# Patient Record
Sex: MALE | Race: WHITE | Employment: OTHER | URBAN - METROPOLITAN AREA
[De-identification: names, ages, dates, MRNs, and addresses within clinical notes are randomized per-mention and may not be internally consistent; named-entity substitution may affect disease eponyms.]

---

## 2018-05-30 ENCOUNTER — HOSPITAL ENCOUNTER (OUTPATIENT)
Dept: LAB | Age: 78
Discharge: HOME | End: 2018-05-30
Payer: MEDICARE

## 2018-05-30 ENCOUNTER — OFFICE VISIT (OUTPATIENT)
Dept: UROLOGY | Age: 78
End: 2018-05-30

## 2018-05-30 VITALS
DIASTOLIC BLOOD PRESSURE: 77 MMHG | BODY MASS INDEX: 17.04 KG/M2 | HEIGHT: 66 IN | HEART RATE: 92 BPM | OXYGEN SATURATION: 96 % | WEIGHT: 106 LBS | SYSTOLIC BLOOD PRESSURE: 129 MMHG

## 2018-05-30 DIAGNOSIS — C67.9 MALIGNANT NEOPLASM OF URINARY BLADDER, UNSPECIFIED SITE (HCC): ICD-10-CM

## 2018-05-30 DIAGNOSIS — N40.0 BENIGN PROSTATIC HYPERPLASIA, UNSPECIFIED WHETHER LOWER URINARY TRACT SYMPTOMS PRESENT: ICD-10-CM

## 2018-05-30 DIAGNOSIS — C67.9 MALIGNANT NEOPLASM OF URINARY BLADDER, UNSPECIFIED SITE (HCC): Primary | ICD-10-CM

## 2018-05-30 LAB
BILIRUB UR QL STRIP: NEGATIVE
CREAT SERPL-MCNC: 1.23 MG/DL (ref 0.6–1.3)
GLUCOSE UR-MCNC: NEGATIVE MG/DL
KETONES P FAST UR STRIP-MCNC: NEGATIVE MG/DL
PH UR STRIP: 6 [PH] (ref 4.6–8)
PROT UR QL STRIP: NEGATIVE
PSA SERPL-MCNC: 1.3 NG/ML (ref 0–4)
SP GR UR STRIP: 1.02 (ref 1–1.03)
UA UROBILINOGEN AMB POC: NORMAL (ref 0.2–1)
URINALYSIS CLARITY POC: CLEAR
URINALYSIS COLOR POC: YELLOW
URINE BLOOD POC: NEGATIVE
URINE LEUKOCYTES POC: NEGATIVE
URINE NITRITES POC: NEGATIVE

## 2018-05-30 PROCEDURE — 84153 ASSAY OF PSA TOTAL: CPT | Performed by: UROLOGY

## 2018-05-30 PROCEDURE — 82565 ASSAY OF CREATININE: CPT | Performed by: UROLOGY

## 2018-05-30 NOTE — PROGRESS NOTES
Mr. Kiya Stuart has a reminder for a \"due or due soon\" health maintenance. I have asked that he contact his primary care provider for follow-up on this health maintenance.

## 2018-05-30 NOTE — PROGRESS NOTES
Gene KRISTIE Sierra 76 y.o. male     Mr. Chema William seen today for bladder tumor follow-up-patient is a 20 year history of recurrent low-grade bladder tumors previously managed by [facility] most recent cystoscopic fulguration in 2016-also has history of symptomatic BPH undergoing laser TURP several years ago-patient tells me he has undergone TURBT 5 or 6 times with pathology showing low grade T-cell carcinoma    Patient is currently voiding with a forceful stream good control dysuria once or twice per night    Negative urine cytology in September 2017    Small bladder tumor fulguration in 2016    PSA 1.1 in 2014    CT scan imaging with IV contrast in 2014 shows normal kidneys ureters and bladder-report only available for review-no images available on PACS    Review of Systems:   CNS: Blurred vision-frequent headaches- no dizziness seizure or syncope/chronic stress and anxiety  Respiratory: COPD-no wheezing or  shortness of breath or cough  Cardiovascular: Hypertension-no angina no palpitations  Intestinal: No dyspepsia diarrhea or constipation  Urinary: Symptomatic BPH with recurrent low-grade bladder tumors  Skeletal: Low back pain/large joint arthritis-diffuse muscle aches and pains  Endocrine: No diabetes or thyroid disease  Other:    Allergies: Allergies   Allergen Reactions    Bupropion Unknown (comments)    Influenza Virus Vaccines Other (comments)     Intolerance       Medications:    Current Outpatient Prescriptions   Medication Sig Dispense Refill    calcium-cholecalciferol, D3, (CALTRATE 600+D) tablet       ibandronate (BONIVA) 150 mg tablet       venlafaxine-SR (EFFEXOR XR) 150 mg capsule Take  by mouth daily.  losartan-hydrochlorothiazide (HYZAAR) 100-25 mg per tablet Take 1 Tab by mouth daily.  simvastatin (ZOCOR) 10 mg tablet Take  by mouth nightly.  ADULT ASPIRIN EC LOW STRENGTH PO Take  by mouth.         ADVAIR -41 mcg/actuation inhaler       cyanocobalamin (VITAMIN B12) 2,500 mcg sublingual tablet Take 2,500 mcg by mouth daily.  MULTIVITAMINS,THERAPEUTIC (THERAGRAN PO) Take  by mouth. Past Medical History:   Diagnosis Date    Anxiety     Atherosclerosis of native arteries of the extremities, unspecified     BPH (benign prostatic hypertrophy) with urinary obstruction     BPH with obstruction/lower urinary tract symptoms     Chronic airway obstruction, not elsewhere classified     Claudication (HCC)     Colon polyp     COPD (chronic obstructive pulmonary disease) (HCC)     Depression     ED (erectile dysfunction) of organic origin     Heart disease, unspecified     Hematuria     Hemorrhoid     HTN (hypertension)     Hypercholesteremia     Hyperlipidemia     Malignant neoplasm of lateral wall of urinary bladder ([address] 10/16/2014    TaG1 TCC, s/p TURBT 10/16/14    Nocturia     Nonspecific abnormal electrocardiogram (ECG) (EKG)     Occult GI bleeding     Osteoporosis     [facility]lobular emphysema ([facility]     Tobacco use disorder     Unspecified hearing loss       Past Surgical History:   Procedure Laterality Date    HX CATARACT REMOVAL      bilateral    HX HERNIA REPAIR      HX MOHS PROCEDURES      HX OTHER SURGICAL      PVP (10 years ago)    HX OTHER SURGICAL  2014    stigmatism fixed    HX TONSILLECTOMY      HX UROLOGICAL  10/16/2014    [address] [address], Cystoscopy, TURBT (medium), Instillation of intravesical chemotherapy (mitomycin C). Dr. Romero  HX UROLOGICAL  2016    [address]. TURBT: Low grade papillary urothelial carcinoma, non-invasive (pTa). Dr. Karen Price.      Family History   Problem Relation Age of Onset    Heart Disease Father     Other Father     Dementia Mother     Other Paternal Grandmother     Other Brother     Cancer Maternal Aunt         Physical Examination: Nourished mature male thin and trim appearing    Abdomen is nontender no palpable masses no organomegaly  Back-no percussion CVA tenderness on either side  No inguinal hernia or adenopathy  Penis is normal  Testes are normal in size shape and consistency bilaterally-no epididymal induration or tenderness on either side  Spermatic cords are palpably normal bilaterally  Scrotum is normal  Prostate by JENNIFER is rounded, smooth, benign in consistency and nontender-no induration no nodularity  No rectal masses tenderness or induration      Urinalysis: Negative dipstick/nitrite negative/heme-negative        Impression: Symptomatic BPH-stable status post laser TURP                        Recurrent low-grade T-cell carcinoma bladder        Plan: Cystoscopy next visit    RTC 1 month cystoscopy        Alena Bolanos MD  -electronically signed-    PLEASE NOTE:  This document has been produced using voice recognition software. Unrecognized errors in transcription may be present.

## 2018-05-30 NOTE — MR AVS SNAPSHOT
[address] [address] 
[phone] Patient: Selin Conti MRN: [id number] :1940 Visit Information Date & Time Provider Department Dept. Phone Encounter #  
 2018 10:00 AM Raquel Mendez, [facility] [id number] Upcoming Health Maintenance Date Due DTaP/Tdap/Td series (1 - Tdap) 10/29/1959 ZOSTER VACCINE AGE 60> 1998 GLAUCOMA SCREENING Q2Y 10/29/2003 Pneumococcal 65+ High/Highest Risk (1 of 2 - PCV13) 10/29/2003 MEDICARE YEARLY EXAM 3/14/2018 Influenza Age 5 to Adult 2018 Allergies as of 2018  Review Complete On: 2018 By: Jose Thibodeaux LPN Severity Noted Reaction Type Reactions Bupropion  2012    Unknown (comments) Influenza Virus Vaccines  2012    Other (comments) Intolerance Current Immunizations  Never Reviewed No immunizations on file. Not reviewed this visit You Were Diagnosed With   
  
 Codes Comments Malignant neoplasm of urinary bladder, unspecified site [facility])    -  Primary ICD-10-CM: C67.9 ICD-9-CM: 188. 9 Benign prostatic hyperplasia, unspecified whether lower urinary tract symptoms present     ICD-10-CM: N40.0 ICD-9-CM: 600.00 Vitals BP Pulse Height(growth percentile) Weight(growth percentile) SpO2 BMI  
 129/77 (BP 1 Location: Right arm, BP Patient Position: Sitting) 92 5' 6\" (1.676 m) 106 lb (48.1 kg) 96% 17.11 kg/m2 Smoking Status Current Every Day Smoker Vitals History BMI and BSA Data Body Mass Index Body Surface Area  
 17.11 kg/m 2 1.5 m 2 Preferred Pharmacy Pharmacy Name Phone [phone] [place] [address],[address] [phone] Your Updated Medication List  
  
   
This list is accurate as of 18 10:28 AM.  Always use your most recent med list.  
  
  
  
  
 ADULT ASPIRIN EC LOW STRENGTH PO Take  by mouth. ADVAIR -21 mcg/actuation inhaler Generic drug:  fluticasone-salmeterol  
  
 calcium-cholecalciferol (D3) tablet Commonly known as:  CALTRATE 600+D  
  
 cyanocobalamin 2,500 mcg sublingual tablet Commonly known as:  VITAMIN B12 Take 2,500 mcg by mouth daily. EFFEXOR  mg capsule Generic drug:  venlafaxine-SR Take  by mouth daily. HYZAAR 100-25 mg per tablet Generic drug:  losartan-hydroCHLOROthiazide Take 1 Tab by mouth daily. ibandronate 150 mg tablet Commonly known as:  Earleen List THERAGRAN PO Take  by mouth. ZOCOR 10 mg tablet Generic drug:  simvastatin Take  by mouth nightly. We Performed the Following AMB POC URINALYSIS DIP STICK AUTO W/O MICRO [93258 CPT(R)] COLLECTION VENOUS BLOOD,VENIPUNCTURE [id number] CPT(R)] Patient Instructions Bladder Cancer: Care Instructions Your Care Instructions Bladder cancer occurs when abnormal cells grow out of control in the bladder. It usually can be cured when it is found early. It is more common in older people. Treatment may include surgery to remove part of the bladder. If the tumor is large, the entire bladder may be removed. You may also have radiation or chemotherapy to kill the cancer cells. Sometimes people get treatment with medicines that help the body's natural defenses, or immune system, fight the cancer. Finding out that you have cancer is scary. You may feel many emotions and may need some help coping. Seek out family, friends, and counselors for support. You also can do things at home to make yourself feel better while you go through treatment. Call the [employer] (2-685.765.1186) or visit its website at [employer] [employer]. org for more information. Follow-up care is a key part of your treatment and safety.  Be sure to make and go to all appointments, and call your doctor if you are having problems. It's also a good idea to know your test results and keep a list of the medicines you take. How can you care for yourself at home? · Take your medicines exactly as prescribed. Call your doctor if you think you are having a problem with your medicine. You may get medicine for nausea and vomiting if you have these side effects. · Eat healthy food. If you are not hungry, try to eat food that has protein and extra calories to keep up your strength and prevent weight loss. Drink liquid meal replacements for extra calories and protein. Try to eat your main meal early. · Get some physical activity every day, but do not get too tired. Keep doing the hobbies you enjoy as your energy allows. · Take steps to control your stress and workload. Learn relaxation techniques. ¨ Share your feelings. Stress and tension affect our emotions. By expressing your feelings to others, you may be able to understand and cope with them. ¨ Consider joining a support group. Talking about a problem with your spouse, a good friend, or other people with similar problems is a good way to reduce tension and stress. ¨ Express yourself through art. Try writing, dance, art, or crafts to relieve tension. Some dance, writing, or art groups may be available just for people who have cancer. ¨ Be kind to your body and mind. Getting enough sleep, eating a healthy diet, and taking time to do things you enjoy can contribute to an overall feeling of balance in your life and help reduce stress. ¨ Get help if you need it. Discuss your concerns with your doctor or counselor. · If you are vomiting or have diarrhea: ¨ Drink plenty of fluids (enough so that your urine is light yellow or clear like water) to prevent dehydration. Choose water and other caffeine-free clear liquids.  If you have kidney, heart, or liver disease and have to limit fluids, talk with your doctor before you increase the amount of fluids you drink. ¨ When you are able to eat, try clear soups, mild foods, and liquids until all symptoms are gone for 12 to 48 hours. Other good choices include dry toast, crackers, cooked cereal, and gelatin dessert, such as Jell-O. 
· Take care of your urinary tract to prevent problems such as infection, which can be caused by bladder cancer and its treatment. Limit drinks with caffeine, drink plenty of fluids, and urinate every 3 to 4 hours. · If you have not already done so, prepare a list of advance directives. Advance directives are instructions to your doctor and family members about what kind of care you want if you become unable to speak for yourself. When should you call for help? Call your doctor now or seek immediate medical care if: 
? · You have pain that does not get better after taking pain medicine. ? · You have symptoms of a kidney infection. These may include: 
¨ Pain or burning when you urinate. ¨ A frequent need to urinate without being able to pass much urine. ¨ Pain in the flank, which is just below the rib cage and above the waist on either side of the back. ¨ Blood in your urine. ¨ A fever. ? Watch closely for changes in your health, and be sure to contact your doctor if: 
? · You do not get better as expected. Where can you learn more? Go to http://jannet-carline.info/. Enter M796 in the search box to learn more about \"Bladder Cancer: Care Instructions. \" Current as of: May 12, 2017 Content Version: 11.4 © 5533-5442 [employer]. Care instructions adapted under license by [employer] (which disclaims liability or warranty for this information). If you have questions about a medical condition or this instruction, always ask your healthcare professional. [address] any warranty or liability for your use of this information. Introducing [facility]! Dear Juan Jose: 
Thank you for requesting a imo.im account. Our records indicate that you have previously registered for a imo.im account but its currently inactive. Please call our imo.im support line at [phone]. Additional Information If you have questions, please visit the Frequently Asked Questions section of the [email or web address] website at [email or web address] Remember, [email or web address] is NOT to be used for urgent needs. For medical emergencies, dial 911. Now available from your iPhone and Android! Please provide this summary of care documentation to your next provider. Your primary care clinician is listed as Corrina Medina. If you have any questions after today's visit, please call [phone].

## 2018-05-30 NOTE — PATIENT INSTRUCTIONS
Bladder Cancer: Care Instructions  Your Care Instructions    Bladder cancer occurs when abnormal cells grow out of control in the bladder. It usually can be cured when it is found early. It is more common in older people. Treatment may include surgery to remove part of the bladder. If the tumor is large, the entire bladder may be removed. You may also have radiation or chemotherapy to kill the cancer cells. Sometimes people get treatment with medicines that help the body's natural defenses, or immune system, fight the cancer. Finding out that you have cancer is scary. You may feel many emotions and may need some help coping. Seek out family, friends, and counselors for support. You also can do things at home to make yourself feel better while you go through treatment. Call the [employer] (7-879.151.9591) or visit its website at [employer] for more information. Follow-up care is a key part of your treatment and safety. Be sure to make and go to all appointments, and call your doctor if you are having problems. It's also a good idea to know your test results and keep a list of the medicines you take. How can you care for yourself at home? · Take your medicines exactly as prescribed. Call your doctor if you think you are having a problem with your medicine. You may get medicine for nausea and vomiting if you have these side effects. · Eat healthy food. If you are not hungry, try to eat food that has protein and extra calories to keep up your strength and prevent weight loss. Drink liquid meal replacements for extra calories and protein. Try to eat your main meal early. · Get some physical activity every day, but do not get too tired. Keep doing the hobbies you enjoy as your energy allows. · Take steps to control your stress and workload. Learn relaxation techniques. ¨ Share your feelings. Stress and tension affect our emotions.  By expressing your feelings to others, you may be able to understand and cope with them. ¨ Consider joining a support group. Talking about a problem with your spouse, a good friend, or other people with similar problems is a good way to reduce tension and stress. ¨ Express yourself through art. Try writing, dance, art, or crafts to relieve tension. Some dance, writing, or art groups may be available just for people who have cancer. ¨ Be kind to your body and mind. Getting enough sleep, eating a healthy diet, and taking time to do things you enjoy can contribute to an overall feeling of balance in your life and help reduce stress. ¨ Get help if you need it. Discuss your concerns with your doctor or counselor. · If you are vomiting or have diarrhea:  ¨ Drink plenty of fluids (enough so that your urine is light yellow or clear like water) to prevent dehydration. Choose water and other caffeine-free clear liquids. If you have kidney, heart, or liver disease and have to limit fluids, talk with your doctor before you increase the amount of fluids you drink. ¨ When you are able to eat, try clear soups, mild foods, and liquids until all symptoms are gone for 12 to 48 hours. Other good choices include dry toast, crackers, cooked cereal, and gelatin dessert, such as Jell-O.  · Take care of your urinary tract to prevent problems such as infection, which can be caused by bladder cancer and its treatment. Limit drinks with caffeine, drink plenty of fluids, and urinate every 3 to 4 hours. · If you have not already done so, prepare a list of advance directives. Advance directives are instructions to your doctor and family members about what kind of care you want if you become unable to speak for yourself. When should you call for help? Call your doctor now or seek immediate medical care if:  ? · You have pain that does not get better after taking pain medicine. ? · You have symptoms of a kidney infection. These may include:  ¨ Pain or burning when you urinate.   ¨ A frequent need to urinate without being able to pass much urine. ¨ Pain in the flank, which is just below the rib cage and above the waist on either side of the back. ¨ Blood in your urine. ¨ A fever. ? Watch closely for changes in your health, and be sure to contact your doctor if:  ? · You do not get better as expected. Where can you learn more? Go to http://jannet-carline.info/. Enter M796 in the search box to learn more about \"Bladder Cancer: Care Instructions. \"  Current as of: May 12, 2017  Content Version: 11.4  © 8597-6354 [employer]. Care instructions adapted under license by [employer] (which disclaims liability or warranty for this information). If you have questions about a medical condition or this instruction, always ask your healthcare professional. [address] any warranty or liability for your use of this information.

## 2018-06-21 ENCOUNTER — OFFICE VISIT (OUTPATIENT)
Dept: UROLOGY | Age: 78
End: 2018-06-21

## 2018-06-21 VITALS
OXYGEN SATURATION: 96 % | BODY MASS INDEX: 17.19 KG/M2 | WEIGHT: 107 LBS | HEIGHT: 66 IN | HEART RATE: 87 BPM | DIASTOLIC BLOOD PRESSURE: 61 MMHG | SYSTOLIC BLOOD PRESSURE: 114 MMHG

## 2018-06-21 DIAGNOSIS — C67.9 MALIGNANT NEOPLASM OF URINARY BLADDER, UNSPECIFIED SITE (HCC): Primary | ICD-10-CM

## 2018-06-21 LAB
BILIRUB UR QL STRIP: NEGATIVE
GLUCOSE UR-MCNC: NEGATIVE MG/DL
KETONES P FAST UR STRIP-MCNC: NEGATIVE MG/DL
PH UR STRIP: 7 [PH] (ref 4.6–8)
PROT UR QL STRIP: NEGATIVE
SP GR UR STRIP: 1.01 (ref 1–1.03)
UA UROBILINOGEN AMB POC: NORMAL (ref 0.2–1)
URINALYSIS CLARITY POC: CLEAR
URINALYSIS COLOR POC: YELLOW
URINE BLOOD POC: NEGATIVE
URINE LEUKOCYTES POC: NEGATIVE
URINE NITRITES POC: NEGATIVE

## 2018-06-21 RX ORDER — CIPROFLOXACIN 500 MG/1
500 TABLET ORAL 2 TIMES DAILY
Qty: 6 TAB | Refills: 0 | Status: SHIPPED | OUTPATIENT
Start: 2018-06-21 | End: 2018-06-24

## 2018-06-21 NOTE — PROGRESS NOTES
Mr. Perico Daniel has a reminder for a \"due or due soon\" health maintenance. I have asked that he contact his primary care provider for follow-up on this health maintenance.

## 2018-06-21 NOTE — PROGRESS NOTES
Juan Jose FLOWERS Rigo 76 y.o. male     Mr. Carmita Yadav seen today for annual surveillance cystoscopy  20 year history of recurrent low-grade bladder tumors previously managed by [facility] most recent cystoscopic fulguration in 2016-also has history of symptomatic BPH undergoing laser TURP several years ago-patient tells me he has undergone TURBT 5 or 6 times with pathology showing low grade T-cell carcinoma     Patient is currently voiding with a forceful stream good control dysuria once or twice per night     Negative urine cytology in September 2017     Small bladder tumor fulguration in 2016 ([facility])     PSA 1.1 in 2014  PSA 1.3 in May 2018     CT scan imaging with IV contrast in 2014 shows normal kidneys ureters and bladder-report only available for review-no images available on PACS     Review of Systems:   CNS: Blurred vision-frequent headaches- no dizziness seizure or syncope/chronic stress and anxiety  Respiratory: COPD-no wheezing or  shortness of breath or cough  Cardiovascular: Hypertension-no angina no palpitations  Intestinal: No dyspepsia diarrhea or constipation  Urinary: Symptomatic BPH with recurrent low-grade bladder tumors  Skeletal: Low back pain/large joint arthritis-diffuse muscle aches and pains  Endocrine: No diabetes or thyroid disease  Other:       Allergies: Allergies   Allergen Reactions    Bupropion Unknown (comments)    Influenza Virus Vaccines Other (comments)     Intolerance       Medications:    Current Outpatient Prescriptions   Medication Sig Dispense Refill    ciprofloxacin HCl (CIPRO) 500 mg tablet Take 1 Tab by mouth two (2) times a day for 3 days. 6 Tab 0    calcium-cholecalciferol, D3, (CALTRATE 600+D) tablet       ibandronate (BONIVA) 150 mg tablet       venlafaxine-SR (EFFEXOR XR) 150 mg capsule Take  by mouth daily.  losartan-hydrochlorothiazide (HYZAAR) 100-25 mg per tablet Take 1 Tab by mouth daily.         simvastatin (ZOCOR) 10 mg tablet Take  by mouth nightly.  ADULT ASPIRIN EC LOW STRENGTH PO Take  by mouth.  ADVAIR -21 mcg/actuation inhaler       cyanocobalamin (VITAMIN B12) 2,500 mcg sublingual tablet Take 2,500 mcg by mouth daily.  MULTIVITAMINS,THERAPEUTIC (THERAGRAN PO) Take  by mouth. Past Medical History:   Diagnosis Date    Anxiety     Atherosclerosis of native arteries of the extremities, unspecified     BPH (benign prostatic hypertrophy) with urinary obstruction     BPH with obstruction/lower urinary tract symptoms     Chronic airway obstruction, not elsewhere classified     Claudication (HCC)     Colon polyp     COPD (chronic obstructive pulmonary disease) (HCC)     Depression     ED (erectile dysfunction) of organic origin     Heart disease, unspecified     Hematuria     Hemorrhoid     HTN (hypertension)     Hypercholesteremia     Hyperlipidemia     Malignant neoplasm of lateral wall of urinary bladder ([address] 10/16/2014    TaG1 TCC, s/p TURBT 10/16/14    Nocturia     Nonspecific abnormal electrocardiogram (ECG) (EKG)     Occult GI bleeding 1993    Osteoporosis     Panlobular emphysema ([address]     Tobacco use disorder     Unspecified hearing loss       Past Surgical History:   Procedure Laterality Date    HX CATARACT REMOVAL  2014    bilateral    HX HERNIA REPAIR      HX MOHS PROCEDURES      HX OTHER SURGICAL      PVP (10 years ago)    HX OTHER SURGICAL  2014    stigmatism fixed    HX TONSILLECTOMY  2013    HX UROLOGICAL  10/16/2014    [address] [address], Cystoscopy, TURBT (medium), Instillation of intravesical chemotherapy (mitomycin C). Dr. Lion Waddell HX UROLOGICAL  09/08/2016    [address] TURBT: Low grade papillary urothelial carcinoma, non-invasive (pTa). Dr. Irina Pak.      Family History   Problem Relation Age of Onset    Heart Disease Father     Other Father     Dementia Mother     Other Paternal Grandmother     Other Brother     Cancer Maternal Aunt         Physical Examination: Well-nourished mature male thin and trim no apparent distress    Normal prostate by JENNIFER    Urinalysis: Negative dipstick/nitrite negative/heme-negative      Cystoscopy Report: After prepping the glans penis with Betadine solution and instilling 2% lidocaine jelly intraurethrally a flexible cystoscope was passed through the urethra into the bladder revealing normal urothelium of the bladder and urethra. There are no strictures, stones, tumors, or diverticuli. Mild trabeculation without cellule formation-prostatic channel is excavated with a fundal bladder neck configuration-multiple bladder lining scars from previous TURP evident throughout the bladder-no abnormal blood vessels no injection, glomerulations, or friability-procedure was uncomplicated well-tolerated  EBL-none  Specimen-none        Impression: Bladder carcinoma-negative surveillance cystoscopy                       Symptomatic BPH responding favorably to TURP      Plan: Cipro 500 mg twice daily ×3 days    RTC 1 year surveillance cystoscopy, JENNIFER, PSA, PVR      More than 1/2 of this 15 minute visit was spent in counselling and coordination of care, as described above. Naomi Morrison MD  -electronically signed-    PLEASE NOTE:  This document has been produced using voice recognition software. Unrecognized errors in transcription may be present.

## 2018-06-21 NOTE — PROGRESS NOTES
[facility]  OFFICE PROCEDURE PROGRESS NOTE        Chart reviewed for the following:   Andra LLOYD LPN, have reviewed the History, Physical and updated the Allergic reactions for Gene KRISTIE Sierra     TIME OUT performed immediately prior to start of procedure:   Andra LLOYD LPN, have performed the following reviews on Juan Jose William prior to the start of the procedure:            * Patient was identified by name and date of birth   * Agreement on procedure being performed was verified  * Risks and Benefits explained to the patient  * Procedure site verified and marked as necessary  * Patient was positioned for comfort  * Consent was signed and verified     Time: 13:35       Date of procedure: 6/21/2018    Procedure performed by:  Dorita Virgen MD    Provider assisted by: Zay Barton LPN    Patient assisted by: self    How tolerated by patient: tolerated the procedure well with no complications    Post Procedural Pain Scale: 0 - No Hurt    Comments: Patient verbalized understanding of procedure and post procedure instructions.

## 2018-06-21 NOTE — PATIENT INSTRUCTIONS
Bladder Cancer: Care Instructions  Your Care Instructions    Bladder cancer occurs when abnormal cells grow out of control in the bladder. It usually can be cured when it is found early. It is more common in older people. Treatment may include surgery to remove part of the bladder. If the tumor is large, the entire bladder may be removed. You may also have radiation or chemotherapy to kill the cancer cells. Sometimes people get treatment with medicines that help the body's natural defenses, or immune system, fight the cancer. Finding out that you have cancer is scary. You may feel many emotions and may need some help coping. Seek out family, friends, and counselors for support. You also can do things at home to make yourself feel better while you go through treatment. Call the zlien (5-846.557.1346) or visit its website at 7574 Pebbles Interfaces for more information. Follow-up care is a key part of your treatment and safety. Be sure to make and go to all appointments, and call your doctor if you are having problems. It's also a good idea to know your test results and keep a list of the medicines you take. How can you care for yourself at home? · Take your medicines exactly as prescribed. Call your doctor if you think you are having a problem with your medicine. You may get medicine for nausea and vomiting if you have these side effects. · Eat healthy food. If you are not hungry, try to eat food that has protein and extra calories to keep up your strength and prevent weight loss. Drink liquid meal replacements for extra calories and protein. Try to eat your main meal early. · Get some physical activity every day, but do not get too tired. Keep doing the hobbies you enjoy as your energy allows. · Take steps to control your stress and workload. Learn relaxation techniques. ¨ Share your feelings. Stress and tension affect our emotions.  By expressing your feelings to others, you may be able to understand and cope with them. ¨ Consider joining a support group. Talking about a problem with your spouse, a good friend, or other people with similar problems is a good way to reduce tension and stress. ¨ Express yourself through art. Try writing, dance, art, or crafts to relieve tension. Some dance, writing, or art groups may be available just for people who have cancer. ¨ Be kind to your body and mind. Getting enough sleep, eating a healthy diet, and taking time to do things you enjoy can contribute to an overall feeling of balance in your life and help reduce stress. ¨ Get help if you need it. Discuss your concerns with your doctor or counselor. · If you are vomiting or have diarrhea:  ¨ Drink plenty of fluids (enough so that your urine is light yellow or clear like water) to prevent dehydration. Choose water and other caffeine-free clear liquids. If you have kidney, heart, or liver disease and have to limit fluids, talk with your doctor before you increase the amount of fluids you drink. ¨ When you are able to eat, try clear soups, mild foods, and liquids until all symptoms are gone for 12 to 48 hours. Other good choices include dry toast, crackers, cooked cereal, and gelatin dessert, such as Jell-O.  · Take care of your urinary tract to prevent problems such as infection, which can be caused by bladder cancer and its treatment. Limit drinks with caffeine, drink plenty of fluids, and urinate every 3 to 4 hours. · If you have not already done so, prepare a list of advance directives. Advance directives are instructions to your doctor and family members about what kind of care you want if you become unable to speak for yourself. When should you call for help? Call your doctor now or seek immediate medical care if:  ? · You have pain that does not get better after taking pain medicine. ? · You have symptoms of a kidney infection. These may include:  ¨ Pain or burning when you urinate.   ¨ A frequent need to urinate without being able to pass much urine. ¨ Pain in the flank, which is just below the rib cage and above the waist on either side of the back. ¨ Blood in your urine. ¨ A fever. ? Watch closely for changes in your health, and be sure to contact your doctor if:  ? · You do not get better as expected. Where can you learn more? Go to http://jannet-carline.info/. Enter M796 in the search box to learn more about \"Bladder Cancer: Care Instructions. \"  Current as of: May 12, 2017  Content Version: 11.4  © 2210-3902 Viacore. Care instructions adapted under license by NuoDB (which disclaims liability or warranty for this information). If you have questions about a medical condition or this instruction, always ask your healthcare professional. [address] any warranty or liability for your use of this information.

## 2018-06-21 NOTE — MR AVS SNAPSHOT
[address] [address] [place] [address] 
[phone] Patient: Uday Machado MRN: [id number] :1940 Visit Information Date & Time Provider Department Dept. Phone Encounter #  
 2018  1:00 PM Matthew Pratt, Gary Henning [facility] [phone] Upcoming Health Maintenance Date Due DTaP/Tdap/Td series (1 - Tdap) 10/29/1959 ZOSTER VACCINE AGE 60> 1998 GLAUCOMA SCREENING Q2Y 10/29/2003 Pneumococcal 65+ High/Highest Risk (1 of 2 - PCV13) 10/29/2003 MEDICARE YEARLY EXAM 3/14/2018 Influenza Age 5 to Adult 2018 Allergies as of 2018  Review Complete On: 2018 By: Matthew Pratt MD  
  
 Severity Noted Reaction Type Reactions Bupropion  2012    Unknown (comments) Influenza Virus Vaccines  2012    Other (comments) Intolerance Current Immunizations  Never Reviewed No immunizations on file. Not reviewed this visit You Were Diagnosed With   
  
 Codes Comments Malignant neoplasm of urinary bladder, unspecified site [facility])    -  Primary ICD-10-CM: C67.9 ICD-9-CM: 188. 9 Vitals BP Pulse Height(growth percentile) Weight(growth percentile) SpO2 BMI  
 114/61 (BP 1 Location: Left arm, BP Patient Position: Sitting) 87 5' 6\" (1.676 m) 107 lb (48.5 kg) 96% 17.27 kg/m2 Smoking Status Current Every Day Smoker Vitals History BMI and BSA Data Body Mass Index Body Surface Area  
 17.27 kg/m 2 1.5 m 2 Preferred Pharmacy Pharmacy Name Phone [address], [address] [facility] [phone] Your Updated Medication List  
  
   
This list is accurate as of 18  1:44 PM.  Always use your most recent med list.  
  
  
  
  
 ADULT ASPIRIN EC LOW STRENGTH PO Take  by mouth. ADVAIR -21 mcg/actuation inhaler Generic drug:  fluticasone-salmeterol  
  
 calcium-cholecalciferol (D3) tablet Commonly known as:  CALTRATE 600+D  
  
 ciprofloxacin HCl 500 mg tablet Commonly known as:  CIPRO Take 1 Tab by mouth two (2) times a day for 3 days. cyanocobalamin 2,500 mcg sublingual tablet Commonly known as:  VITAMIN B12 Take 2,500 mcg by mouth daily. EFFEXOR  mg capsule Generic drug:  venlafaxine-SR Take  by mouth daily. HYZAAR 100-25 mg per tablet Generic drug:  losartan-hydroCHLOROthiazide Take 1 Tab by mouth daily. ibandronate 150 mg tablet Commonly known as:  Jadene Docker THERAGRAN PO Take  by mouth. ZOCOR 10 mg tablet Generic drug:  simvastatin Take  by mouth nightly. Prescriptions Sent to Pharmacy Refills  
 ciprofloxacin HCl (CIPRO) 500 mg tablet 0 Sig: Take 1 Tab by mouth two (2) times a day for 3 days. Class: Normal  
 Pharmacy: [address] [place] [address] Jose Cruz Pepe [address] [place] [address] #: [phone] Route: Oral  
  
We Performed the Following AMB POC URINALYSIS DIP STICK AUTO W/O MICRO [13573 CPT(R)] Patient Instructions Bladder Cancer: Care Instructions Your Care Instructions Bladder cancer occurs when abnormal cells grow out of control in the bladder. It usually can be cured when it is found early. It is more common in older people. Treatment may include surgery to remove part of the bladder. If the tumor is large, the entire bladder may be removed. You may also have radiation or chemotherapy to kill the cancer cells. Sometimes people get treatment with medicines that help the body's natural defenses, or immune system, fight the cancer. Finding out that you have cancer is scary. You may feel many emotions and may need some help coping. Seek out family, friends, and counselors for support.  You also can do things at home to make yourself feel better while you go through treatment. Call the Ambreen [place]ug[place] ([phone]) or visit its website at 3548 Xspand. HealthSynch for more information. Follow-up care is a key part of your treatment and safety. Be sure to make and go to all appointments, and call your doctor if you are having problems. It's also a good idea to know your test results and keep a list of the medicines you take. How can you care for yourself at home? · Take your medicines exactly as prescribed. Call your doctor if you think you are having a problem with your medicine. You may get medicine for nausea and vomiting if you have these side effects. · Eat healthy food. If you are not hungry, try to eat food that has protein and extra calories to keep up your strength and prevent weight loss. Drink liquid meal replacements for extra calories and protein. Try to eat your main meal early. · Get some physical activity every day, but do not get too tired. Keep doing the hobbies you enjoy as your energy allows. · Take steps to control your stress and workload. Learn relaxation techniques. ¨ Share your feelings. Stress and tension affect our emotions. By expressing your feelings to others, you may be able to understand and cope with them. ¨ Consider joining a support group. Talking about a problem with your spouse, a good friend, or other people with similar problems is a good way to reduce tension and stress. ¨ Express yourself through art. Try writing, dance, art, or crafts to relieve tension. Some dance, writing, or art groups may be available just for people who have cancer. ¨ Be kind to your body and mind. Getting enough sleep, eating a healthy diet, and taking time to do things you enjoy can contribute to an overall feeling of balance in your life and help reduce stress. ¨ Get help if you need it. Discuss your concerns with your doctor or counselor. · If you are vomiting or have diarrhea: ¨ Drink plenty of fluids (enough so that your urine is light yellow or clear like water) to prevent dehydration. Choose water and other caffeine-free clear liquids. If you have kidney, heart, or liver disease and have to limit fluids, talk with your doctor before you increase the amount of fluids you drink. ¨ When you are able to eat, try clear soups, mild foods, and liquids until all symptoms are gone for 12 to 48 hours. Other good choices include dry toast, crackers, cooked cereal, and gelatin dessert, such as Jell-O. 
· Take care of your urinary tract to prevent problems such as infection, which can be caused by bladder cancer and its treatment. Limit drinks with caffeine, drink plenty of fluids, and urinate every 3 to 4 hours. · If you have not already done so, prepare a list of advance directives. Advance directives are instructions to your doctor and family members about what kind of care you want if you become unable to speak for yourself. When should you call for help? Call your doctor now or seek immediate medical care if: 
? · You have pain that does not get better after taking pain medicine. ? · You have symptoms of a kidney infection. These may include: 
¨ Pain or burning when you urinate. ¨ A frequent need to urinate without being able to pass much urine. ¨ Pain in the flank, which is just below the rib cage and above the waist on either side of the back. ¨ Blood in your urine. ¨ A fever. ? Watch closely for changes in your health, and be sure to contact your doctor if: 
? · You do not get better as expected. Where can you learn more? Go to http://jannet-carline.info/. Enter M796 in the search box to learn more about \"Bladder Cancer: Care Instructions. \" Current as of: May 12, 2017 Content Version: 11.4 © 8690-6826 Healthwise, Chinac.com.  Care instructions adapted under license by Sonos (which disclaims liability or warranty for this information). If you have questions about a medical condition or this instruction, always ask your healthcare professional. [address] any warranty or liability for your use of this information. Introducing [facility]! Dear Juan Jose: 
Thank you for requesting a Jianshu account. Our records indicate that you have previously registered for a Jianshu account but its currently inactive. Please call our Jianshu support line at [phone]. Additional Information If you have questions, please visit the Frequently Asked Questions section of the Jianshu website at [email or web address] Remember, Jianshu is NOT to be used for urgent needs. For medical emergencies, dial 911. Now available from your iPhone and Android! Please provide this summary of care documentation to your next provider. Your primary care clinician is listed as Isabelle Doyle. If you have any questions after today's visit, please call [phone].

## 2018-06-22 ENCOUNTER — OFFICE VISIT (OUTPATIENT)
Dept: UROLOGY | Age: 78
End: 2018-06-22

## 2018-06-22 VITALS
HEART RATE: 90 BPM | HEIGHT: 66 IN | DIASTOLIC BLOOD PRESSURE: 67 MMHG | SYSTOLIC BLOOD PRESSURE: 107 MMHG | OXYGEN SATURATION: 98 % | WEIGHT: 107 LBS | BODY MASS INDEX: 17.19 KG/M2

## 2018-06-22 DIAGNOSIS — R31.0 GROSS HEMATURIA: Primary | ICD-10-CM

## 2018-06-22 LAB
BILIRUB UR QL STRIP: NEGATIVE
GLUCOSE UR-MCNC: NORMAL MG/DL
KETONES P FAST UR STRIP-MCNC: NEGATIVE MG/DL
PH UR STRIP: 5 [PH] (ref 4.6–8)
PROT UR QL STRIP: NORMAL
SP GR UR STRIP: 1.01 (ref 1–1.03)
UA UROBILINOGEN AMB POC: NORMAL (ref 0.2–1)
URINALYSIS CLARITY POC: CLEAR
URINALYSIS COLOR POC: NORMAL
URINE BLOOD POC: NORMAL
URINE LEUKOCYTES POC: NEGATIVE
URINE NITRITES POC: NEGATIVE

## 2018-06-22 NOTE — PROGRESS NOTES
The patient returns, concerned because he had some gross hematuria following the cystoscopy. The patient has a history of multiple surveillance cystoscopies and resections and came in for his usual surveillance cystoscopy yesterday which went by without any obvious event. However, on the way home, the patient voided and had total gross painless hematuria. This lasted for 2 additional urinations. It then became somewhat clear but the patient did note that there were some terminal drops of blood. There was no passive urethral bleeding and there was no irritative symptoms set along with the visible blood. The patient came into the office and his urine is now clear. Her graph the urine is 3+ microscopically positive for blood but is visually clear. I have discussed this with the patient. The patient is taking his prophylactic Cipro. The patient will stop his baby aspirin and avoid any exertion over the weekend. I have reassured him that this is probably simply a disruption of a varicosity within his prostatic fossa and it will resolve on its own    This visit exceeded 10 minutes and greater than 50% was counseling. The patient expresses understanding of the treatment plan and wishes to proceed    This dictation used voice recognition software and there may be mistakes.     Stephanie Cannon MD

## 2018-06-22 NOTE — PROGRESS NOTES
Mr. Benigno Vega has a reminder for a \"due or due soon\" health maintenance. I have asked that he contact his primary care provider for follow-up on this health maintenance.

## 2018-06-22 NOTE — PATIENT INSTRUCTIONS

## 2018-06-22 NOTE — MR AVS SNAPSHOT
[address] [facility] [address] [address] 
[phone] Patient: Dorcas Pérez MRN: [id number] :1940 Visit Information Date & Time Provider Department Dept. Phone Encounter #  
 2018  1:15 PM Jose M Wray, [address] [facility] [phone] Your Appointments 2019  1:00 PM  
PROCEDURE with Eliezer Ponce MD  
[facility] [address] [facility]) Appt Note: yearly cysto w/PSA/PVR  
 [address] [address]  
[phone] [address] [place] [address] Upcoming Health Maintenance Date Due DTaP/Tdap/Td series (1 - Tdap) 10/29/1959 ZOSTER VACCINE AGE 60> 1998 GLAUCOMA SCREENING Q2Y 10/29/2003 Pneumococcal 65+ High/Highest Risk (1 of 2 - PCV13) 10/29/2003 MEDICARE YEARLY EXAM 3/14/2018 Influenza Age 5 to Adult 2018 Allergies as of 2018  Review Complete On: 2018 By: Jose M Wray MD  
  
 Severity Noted Reaction Type Reactions Bupropion  2012    Unknown (comments) Influenza Virus Vaccines  2012    Other (comments) Intolerance Current Immunizations  Never Reviewed No immunizations on file. Not reviewed this visit You Were Diagnosed With   
  
 Codes Comments Gross hematuria    -  Primary ICD-10-CM: R31.0 ICD-9-CM: 599.71 Vitals BP Pulse Height(growth percentile) Weight(growth percentile) SpO2 BMI  
 107/67 (BP 1 Location: Left arm, BP Patient Position: Sitting) 90 5' 6\" (1.676 m) 107 lb (48.5 kg) 98% 17.27 kg/m2 Smoking Status Current Every Day Smoker Vitals History BMI and BSA Data Body Mass Index Body Surface Area  
 17.27 kg/m 2 1.5 m 2 Preferred Pharmacy Pharmacy Name Phone [address], [address] [phone] Your Updated Medication List  
  
   
This list is accurate as of 6/22/18  2:55 PM.  Always use your most recent med list.  
  
  
  
  
 ADULT ASPIRIN EC LOW STRENGTH PO Take  by mouth. ADVAIR -21 mcg/actuation inhaler Generic drug:  fluticasone-salmeterol  
  
 calcium-cholecalciferol (D3) tablet Commonly known as:  CALTRATE 600+D  
  
 ciprofloxacin HCl 500 mg tablet Commonly known as:  CIPRO Take 1 Tab by mouth two (2) times a day for 3 days. cyanocobalamin 2,500 mcg sublingual tablet Commonly known as:  VITAMIN B12 Take 2,500 mcg by mouth daily. EFFEXOR  mg capsule Generic drug:  venlafaxine-SR Take  by mouth daily. HYZAAR 100-25 mg per tablet Generic drug:  losartan-hydroCHLOROthiazide Take 1 Tab by mouth daily. ibandronate 150 mg tablet Commonly known as:  Raquel Metro THERAGRAN PO Take  by mouth. ZOCOR 10 mg tablet Generic drug:  simvastatin Take  by mouth nightly. We Performed the Following AMB POC URINALYSIS DIP STICK AUTO W/O MICRO [94896 CPT(R)] Patient Instructions Blood in the Urine: Care Instructions Your Care Instructions Blood in the urine, or hematuria, may make the urine look red, brown, or pink. There may be blood every time you urinate or just from time to time. You cannot always see blood in the urine, but it will show up in a urine test. 
Blood in the urine may be serious. It should always be checked by a doctor. Your doctor may recommend more tests, including an X-ray, a CT scan, or a cystoscopy (which lets a doctor look inside the urethra and bladder). Blood in the urine can be a sign of another problem. Common causes are bladder infections and kidney stones. An injury to your groin or your genital area can also cause bleeding in the urinary tract. Very hard exercise-such as running a marathon-can cause blood in the urine.  Blood in the urine can also be a sign of kidney disease or cancer in the bladder or kidney. Many cases of blood in the urine are caused by a harmless condition that runs in families. This is called benign familial hematuria. It does not need any treatment. Sometimes your urine may look red or brown even though it does not contain blood. For example, not getting enough fluids (dehydration), taking certain medicines, or having a liver problem can change the color of your urine. Eating foods such as beets, rhubarb, or blackberries or foods with red food coloring can make your urine look red or pink. Follow-up care is a key part of your treatment and safety. Be sure to make and go to all appointments, and call your doctor if you are having problems. It's also a good idea to know your test results and keep a list of the medicines you take. When should you call for help? Call your doctor now or seek immediate medical care if: 
· You have symptoms of a urinary infection. For example: ¨ You have pus in your urine. ¨ You have pain in your back just below your rib cage. This is called flank pain. ¨ You have a fever, chills, or body aches. ¨ It hurts to urinate. ¨ You have groin or belly pain. · You have more blood in your urine. Watch closely for changes in your health, and be sure to contact your doctor if: 
· You have new urination problems. · You do not get better as expected. Where can you learn more? Go to http://jannet-carline.info/. Enter A029 in the search box to learn more about \"Blood in the Urine: Care Instructions. \" Current as of: May 12, 2017 Content Version: 11.4 © 1479-6241 [employer]. Care instructions adapted under license by Offermatic (which disclaims liability or warranty for this information).  If you have questions about a medical condition or this instruction, always ask your healthcare professional. [employer] disclaims any warranty or liability for your use of this information. Patient Instructions History Introducing [facility] SERVICES! Dear Juan Jose: 
Thank you for requesting a Cavium account. Our records indicate that you have previously registered for a Cavium account but its currently inactive. Please call our Cavium support line at [phone]. Additional Information If you have questions, please visit the Frequently Asked Questions section of the Cavium website at [email or web address] Remember, Cavium is NOT to be used for urgent needs. For medical emergencies, dial 911. Now available from your iPhone and Android! Please provide this summary of care documentation to your next provider. Your primary care clinician is listed as Ty Mask. If you have any questions after today's visit, please call [phone].

## 2019-07-11 ENCOUNTER — OFFICE VISIT (OUTPATIENT)
Dept: UROLOGY | Age: 79
End: 2019-07-11

## 2019-07-11 ENCOUNTER — HOSPITAL ENCOUNTER (OUTPATIENT)
Dept: LAB | Age: 79
Discharge: HOME | End: 2019-07-11
Payer: MEDICARE

## 2019-07-11 VITALS
HEIGHT: 66 IN | DIASTOLIC BLOOD PRESSURE: 62 MMHG | HEART RATE: 91 BPM | OXYGEN SATURATION: 98 % | WEIGHT: 99 LBS | SYSTOLIC BLOOD PRESSURE: 130 MMHG | BODY MASS INDEX: 15.91 KG/M2

## 2019-07-11 DIAGNOSIS — N40.1 BENIGN PROSTATIC HYPERPLASIA WITH LOWER URINARY TRACT SYMPTOMS, SYMPTOM DETAILS UNSPECIFIED: ICD-10-CM

## 2019-07-11 DIAGNOSIS — C67.9 MALIGNANT NEOPLASM OF URINARY BLADDER, UNSPECIFIED SITE (HCC): Primary | ICD-10-CM

## 2019-07-11 LAB
BILIRUB UR QL STRIP: NEGATIVE
GLUCOSE UR-MCNC: NEGATIVE MG/DL
KETONES P FAST UR STRIP-MCNC: NEGATIVE MG/DL
PH UR STRIP: 7 [PH] (ref 4.6–8)
PROT UR QL STRIP: NEGATIVE
PSA SERPL-MCNC: 1.2 NG/ML (ref 0–4)
SP GR UR STRIP: 1.02 (ref 1–1.03)
UA UROBILINOGEN AMB POC: NORMAL (ref 0.2–1)
URINALYSIS CLARITY POC: CLEAR
URINALYSIS COLOR POC: YELLOW
URINE BLOOD POC: NORMAL
URINE LEUKOCYTES POC: NEGATIVE
URINE NITRITES POC: NEGATIVE

## 2019-07-11 PROCEDURE — 84153 ASSAY OF PSA TOTAL: CPT

## 2019-07-11 RX ORDER — CIPROFLOXACIN 500 MG/1
500 TABLET ORAL 2 TIMES DAILY
Qty: 6 TAB | Refills: 0 | Status: SHIPPED | OUTPATIENT
Start: 2019-07-11 | End: 2019-07-14

## 2019-07-11 NOTE — PROGRESS NOTES
Juan Jose Sierra [de-identified] y.o. male     Mr. Macie Conte seen today for surveillance cystoscopy for follow-up recurrent bladder tumors also here for yearly prostate evaluation     patient is voiding well wiith solid stream good control nocturia once per night     20 year history of recurrent low-grade bladder tumors previously managed by [facility]rology [facility] most recent cystoscopic fulguration in 2016-also has history of symptomatic BPH undergoing laser TURP several years ago-patient tells me he has undergone TURBT 5 or 6 times with pathology showing low grade T-cell carcinoma-most recent bladder tumor development treated by cystoscopic office fulguration in 2016 by Urology [facility]     Patient is currently voiding with a forceful stream good control dysuria once or twice per night     Negative urine cytology in September 2017        PSA 1.1 in 2014  PSA 1.3 in May 2018    PVR 15 cc in July 2019     CT scan imaging with IV contrast in 2014 shows normal kidneys ureters and bladder-report only available for review-no images available on PACS    Surveillance cystoscopy in June 2018 -- SONIA     Review of Systems:   CNS: Blurred vision-frequent headaches- no dizziness seizure or syncope/chronic stress and anxiety  Respiratory: COPD-no wheezing or  shortness of breath or cough  Cardiovascular: Hypertension-no angina no palpitations  Intestinal: No dyspepsia diarrhea or constipation  Urinary: Symptomatic BPH with recurrent low-grade bladder tumors  Skeletal: Low back pain/large joint arthritis-diffuse muscle aches and pains  Endocrine: No diabetes or thyroid disease  Other:    Allergies: Allergies   Allergen Reactions    Bupropion Unknown (comments)    Influenza Virus Vaccines Other (comments)     Intolerance       Medications:    Current Outpatient Medications   Medication Sig Dispense Refill    calcium-cholecalciferol, D3, (CALTRATE 600+D) tablet       venlafaxine-SR (EFFEXOR XR) 150 mg capsule Take  by mouth daily.         losartan-hydrochlorothiazide (HYZAAR) 100-25 mg per tablet Take 1 Tab by mouth daily.  simvastatin (ZOCOR) 10 mg tablet Take  by mouth nightly.  ADULT ASPIRIN EC LOW STRENGTH PO Take  by mouth.  ibandronate (BONIVA) 150 mg tablet       ADVAIR -21 mcg/actuation inhaler       cyanocobalamin (VITAMIN B12) 2,500 mcg sublingual tablet Take 2,500 mcg by mouth daily.  MULTIVITAMINS,THERAPEUTIC (THERAGRAN PO) Take  by mouth. Past Medical History:   Diagnosis Date    Anxiety     Atherosclerosis of native arteries of the extremities, unspecified     BPH (benign prostatic hypertrophy) with urinary obstruction     BPH with obstruction/lower urinary tract symptoms     Chronic airway obstruction, not elsewhere classified     Claudication (HCC)     Colon polyp     COPD (chronic obstructive pulmonary disease) (HCC)     Depression     ED (erectile dysfunction) of organic origin     Heart disease, unspecified     Hematuria     Hemorrhoid     HTN (hypertension)     Hypercholesteremia     Hyperlipidemia     Malignant neoplasm of lateral wall of urinary bladder ([address] 10/16/2014    TaG1 TCC, s/p TURBT 10/16/14    Nocturia     Nonspecific abnormal electrocardiogram (ECG) (EKG)     Occult GI bleeding 1993    Osteoporosis     Panlobular emphysema ([address]     Tobacco use disorder     Unspecified hearing loss       Past Surgical History:   Procedure Laterality Date    HX CATARACT REMOVAL  2014    bilateral    HX HERNIA REPAIR      HX MOHS PROCEDURES      HX OTHER SURGICAL      PVP (10 years ago)    HX OTHER SURGICAL  2014    stigmatism fixed    HX TONSILLECTOMY  2013    HX UROLOGICAL  10/16/2014    [address], Cystoscopy, TURBT (medium), Instillation of intravesical chemotherapy (mitomycin C). Dr. Claude Joel HX UROLOGICAL  09/08/2016    [address]. TURBT: Low grade papillary urothelial carcinoma, non-invasive (pTa). Dr. Summer Martinez.      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   Social Needs    Financial resource strain: Not on file    Food insecurity:     Worry: Not on file     Inability: Not on file   ONTRAPORT needs:     Medical: Not on file     Non-medical: Not on file   Tobacco Use    Smoking status: Current Every Day Smoker     Packs/day: 1.00     Years: 60.00     Pack years: 60.00     Types: Cigarettes    Smokeless tobacco: Never Used    Tobacco comment: pt has tried multiple things to quit   Substance and Sexual Activity    Alcohol use:  Yes     Alcohol/week: 0.0 oz    Drug use: No    Sexual activity: Not Currently   Lifestyle    Physical activity:     Days per week: Not on file     Minutes per session: Not on file    Stress: Not on file   Relationships    Social connections:     Talks on phone: Not on file     Gets together: Not on file     Attends Faith service: Not on file     Active member of club or organization: Not on file     Attends meetings of clubs or organizations: Not on file     Relationship status: Not on file    Intimate partner violence:     Fear of current or ex partner: Not on file     Emotionally abused: Not on file     Physically abused: Not on file     Forced sexual activity: Not on file   Other Topics Concern    Not on file   Social History Narrative    Not on file      Family History   Problem Relation Age of Onset    Heart Disease Father     Other Father     Dementia Mother     Other Paternal Grandmother     Other Brother     Cancer Maternal Aunt         Physical Examination: Well-nourished mature male in no apparent distress thin and trim appearing    Prostate by JENNIFER is rounded, smooth, benign consistency nontender no induration no nodularity    Urinalysis: Negative dipstick/nitrite negative/heme-negative    PVR today 15 cc    Cystoscopy report: After prepping the glans penis with Betadine solution and instilling 2% lidocaine jelly intraurethrally a flexible cystoscope was passed through the urethra into the bladder revealing normal urothelium throughout. There are no strictures, stones, tumors,  or diverticuli. Moderate trabeculation throughout. Ureteral orifices are slitlike with clear urine jets bilaterally-there are no abnormal blood vessels-no glomerulations hemorrhages injection or friability evident in the bladder outlet or prostatic channel which is well excavated showing changes from previous TURP. -Procedure was uncomplicated and well-tolerated  EBL-none  Specimens-none    Impression: Symptomatic BPH responding favorably to TURP                        Recurrent bladder tumor responding favorably to TURBT/BCG Rx / negative surveillance cystoscopy today          Plan: Cipro 500 mg twice daily x3 days    PSA today    RTC 1 year PSA JENNIFER PVR surveillance cystoscopy      More than 1/2 of this 25 minute visit was spent in counselling and coordination of care, as described above. Narvis Brunner, MD  -electronically signed-    PLEASE NOTE:  This document has been produced using voice recognition software. Unrecognized errors in transcription may be present.

## 2019-07-11 NOTE — PATIENT INSTRUCTIONS
Cystoscopy: Care Instructions Your Care Instructions Cystoscopy is a test. It uses a thin, lighted tube called a cystoscope to see the inside of the bladder and the urethra. The urethra is the tube that carries urine out of the body. This test is helpful because it lets your doctor see areas of your bladder and urethra that are hard to see on X-rays. It can help your doctor find bladder stones, tumors, bleeding, and infection. During this test, your doctor also can take tissue and urine samples. And if your doctor finds small stones or growths, he or she can remove them. In most cases the scope is in the bladder for less than 10 minutes. But the entire test may take 45 minutes or longer. You will probably get local anesthesia. This numbs a small part of your body. Or you may get spinal anesthesia, which numbs more of your body. Once in a while, doctors use general anesthesia. It makes you sleep during surgery. If you get a local anesthetic, you may be able to get up right after the test. But if you had spinal or general anesthesia, you will stay in the recovery room until you are able to walk or you have feeling again in your lower body. This usually takes about an hour. Your doctor may be able to tell you some of the results right after the test. But the complete results may take several days. Follow-up care is a key part of your treatment and safety. Be sure to make and go to all appointments, and call your doctor if you are having problems. It's also a good idea to know your test results and keep a list of the medicines you take. How can you care for yourself at home? Before the test 
· If you are having a local anesthetic, you can eat and drink before the test. 
· If you are having a spinal or general anesthetic, do not eat or drink anything for at least 8 hours before the test. Tell your doctor what medicines you take.  
· If you are not staying overnight in the hospital, make sure you have someone who can drive you home after the test. 
After the test 
· If your doctor prescribed antibiotics, take them as directed. Do not stop taking them just because you feel better. You need to take the full course of antibiotics. · You may have some burning when you urinate for a day or two after the test. You may feel better if you drink more fluids. This may also help prevent an infection. · Your urine may have a pinkish color for a few days after the test. 
When should you call for help? Call your doctor now or seek immediate medical care if: 
  · Your urine is still red or you see blood clots after you have urinated several times.  
  · You cannot pass urine 8 hours after the test.  
  · You get a fever or chills.  
  · You have pain in your belly or your back just below your rib cage. This is also called flank pain.  
 Watch closely for changes in your health, and be sure to contact your doctor if: 
  · You have pain or burning when you urinate. A burning sensation is normal for a day or two after the test. But call if it does not get better.  
  · You have a frequent urge to urinate but can pass only small amounts of urine.  
  · Your urine is pink, red, or cloudy or smells bad. It is normal for the urine to have a pinkish color for a few days after the test. But call if it does not get better.  
  · You do not get better as expected. Where can you learn more? Go to http://jannet-carline.info/. Enter S359 in the search box to learn more about \"Cystoscopy: Care Instructions. \" Current as of: March 20, 2018 Content Version: 11.9 © 0111-2674 [employer]. Care instructions adapted under license by [employer] (which disclaims liability or warranty for this information).  If you have questions about a medical condition or this instruction, always ask your healthcare professional. [address] any warranty or liability for your use of this information.

## 2019-07-11 NOTE — PROGRESS NOTES
Mr. Latanya Pacheco has a reminder for a \"due or due soon\" health maintenance. I have asked that he contact his primary care provider for follow-up on this health maintenance. [facility]  OFFICE PROCEDURE PROGRESS NOTE        Chart reviewed for the following:   IStephanie LPN, have reviewed the History, Physical and updated the Allergic reactions for [address] performed immediately prior to start of procedure:   Alexandria Dumas LPN, have performed the following reviews on [address] prior to the start of the procedure:            * Patient was identified by name and date of birth   * Agreement on procedure being performed was verified  * Risks and Benefits explained to the patient  * Procedure site verified and marked as necessary  * Patient was positioned for comfort  * Consent was signed and verified     Time: 3:34PM      Date of procedure: 7/11/2019    Procedure performed by:  Lavonia Mortimer, MD    Provider assisted by: Devaughn Ortiz LPN    Patient assisted by: self    How tolerated by patient: tolerated the procedure well with no complications    Post Procedural Pain Scale: 0 - No Hurt    Comments: none    Patient verbalized understanding of procedure and post procedure instructions.